# Patient Record
Sex: MALE | Race: WHITE | NOT HISPANIC OR LATINO | Employment: UNEMPLOYED | URBAN - METROPOLITAN AREA
[De-identification: names, ages, dates, MRNs, and addresses within clinical notes are randomized per-mention and may not be internally consistent; named-entity substitution may affect disease eponyms.]

---

## 2023-07-31 LAB
ALANINE AMINOTRANSFERASE (SGPT) (U/L) IN SER/PLAS: 23 U/L (ref 10–52)
ALBUMIN (G/DL) IN SER/PLAS: 4.6 G/DL (ref 3.4–5)
ALKALINE PHOSPHATASE (U/L) IN SER/PLAS: 85 U/L (ref 33–120)
ANION GAP IN SER/PLAS: 13 MMOL/L (ref 10–20)
ASPARTATE AMINOTRANSFERASE (SGOT) (U/L) IN SER/PLAS: 16 U/L (ref 9–39)
BASOPHILS (10*3/UL) IN BLOOD BY AUTOMATED COUNT: 0.07 X10E9/L (ref 0–0.1)
BASOPHILS/100 LEUKOCYTES IN BLOOD BY AUTOMATED COUNT: 0.9 % (ref 0–2)
BILIRUBIN TOTAL (MG/DL) IN SER/PLAS: 0.4 MG/DL (ref 0–1.2)
CALCIUM (MG/DL) IN SER/PLAS: 9.5 MG/DL (ref 8.6–10.6)
CARBON DIOXIDE, TOTAL (MMOL/L) IN SER/PLAS: 27 MMOL/L (ref 21–32)
CARCINOEMBRYONIC AG (NG/ML) IN SER/PLAS: <0.5 UG/L
CHLORIDE (MMOL/L) IN SER/PLAS: 102 MMOL/L (ref 98–107)
CREATININE (MG/DL) IN SER/PLAS: 0.99 MG/DL (ref 0.5–1.3)
EOSINOPHILS (10*3/UL) IN BLOOD BY AUTOMATED COUNT: 0.19 X10E9/L (ref 0–0.7)
EOSINOPHILS/100 LEUKOCYTES IN BLOOD BY AUTOMATED COUNT: 2.4 % (ref 0–6)
ERYTHROCYTE DISTRIBUTION WIDTH (RATIO) BY AUTOMATED COUNT: 14.4 % (ref 11.5–14.5)
ERYTHROCYTE MEAN CORPUSCULAR HEMOGLOBIN CONCENTRATION (G/DL) BY AUTOMATED: 32.1 G/DL (ref 32–36)
ERYTHROCYTE MEAN CORPUSCULAR VOLUME (FL) BY AUTOMATED COUNT: 90 FL (ref 80–100)
ERYTHROCYTES (10*6/UL) IN BLOOD BY AUTOMATED COUNT: 5.84 X10E12/L (ref 4.5–5.9)
GFR MALE: 90 ML/MIN/1.73M2
GLUCOSE (MG/DL) IN SER/PLAS: 215 MG/DL (ref 74–99)
HEMATOCRIT (%) IN BLOOD BY AUTOMATED COUNT: 52.4 % (ref 41–52)
HEMOGLOBIN (G/DL) IN BLOOD: 16.8 G/DL (ref 13.5–17.5)
IMMATURE GRANULOCYTES/100 LEUKOCYTES IN BLOOD BY AUTOMATED COUNT: 0.3 % (ref 0–0.9)
LEUKOCYTES (10*3/UL) IN BLOOD BY AUTOMATED COUNT: 8 X10E9/L (ref 4.4–11.3)
LYMPHOCYTES (10*3/UL) IN BLOOD BY AUTOMATED COUNT: 2.11 X10E9/L (ref 1.2–4.8)
LYMPHOCYTES/100 LEUKOCYTES IN BLOOD BY AUTOMATED COUNT: 26.4 % (ref 13–44)
MONOCYTES (10*3/UL) IN BLOOD BY AUTOMATED COUNT: 0.5 X10E9/L (ref 0.1–1)
MONOCYTES/100 LEUKOCYTES IN BLOOD BY AUTOMATED COUNT: 6.3 % (ref 2–10)
NEUTROPHILS (10*3/UL) IN BLOOD BY AUTOMATED COUNT: 5.1 X10E9/L (ref 1.2–7.7)
NEUTROPHILS/100 LEUKOCYTES IN BLOOD BY AUTOMATED COUNT: 63.7 % (ref 40–80)
NRBC (PER 100 WBCS) BY AUTOMATED COUNT: 0 /100 WBC (ref 0–0)
PLATELETS (10*3/UL) IN BLOOD AUTOMATED COUNT: 283 X10E9/L (ref 150–450)
POTASSIUM (MMOL/L) IN SER/PLAS: 4 MMOL/L (ref 3.5–5.3)
PROTEIN TOTAL: 7.5 G/DL (ref 6.4–8.2)
SODIUM (MMOL/L) IN SER/PLAS: 138 MMOL/L (ref 136–145)
UREA NITROGEN (MG/DL) IN SER/PLAS: 17 MG/DL (ref 6–23)

## 2023-09-12 PROBLEM — C20 RECTAL ADENOCARCINOMA (MULTI): Status: ACTIVE | Noted: 2023-09-12

## 2023-09-12 PROBLEM — K61.0 PERIANAL ABSCESS: Status: ACTIVE | Noted: 2023-09-12

## 2023-09-12 PROBLEM — Z92.3 HISTORY OF RADIATION THERAPY: Status: ACTIVE | Noted: 2023-09-12

## 2023-09-12 PROBLEM — I10 HYPERTENSION: Status: ACTIVE | Noted: 2023-09-12

## 2023-09-12 PROBLEM — E78.00 HYPERCHOLESTEROLEMIA: Status: ACTIVE | Noted: 2023-09-12

## 2023-09-12 PROBLEM — A06.9 AMEBIASIS: Status: ACTIVE | Noted: 2023-09-12

## 2023-09-12 PROBLEM — K58.9 IRRITABLE BOWEL SYNDROME: Status: ACTIVE | Noted: 2023-09-12

## 2023-09-12 RX ORDER — LOPERAMIDE HCL 2 MG
2 TABLET ORAL AS NEEDED
COMMUNITY
Start: 2018-10-31

## 2023-09-12 RX ORDER — ATORVASTATIN CALCIUM 20 MG/1
1 TABLET, FILM COATED ORAL DAILY
COMMUNITY
Start: 2018-10-23

## 2023-09-12 RX ORDER — ALPRAZOLAM 0.25 MG/1
1 TABLET ORAL NIGHTLY PRN
COMMUNITY

## 2023-09-12 RX ORDER — OMEPRAZOLE 20 MG/1
1 CAPSULE, DELAYED RELEASE ORAL DAILY
COMMUNITY
Start: 2017-08-20

## 2023-09-12 RX ORDER — LISINOPRIL AND HYDROCHLOROTHIAZIDE 10; 12.5 MG/1; MG/1
1 TABLET ORAL DAILY
COMMUNITY
Start: 2017-08-20

## 2023-09-12 RX ORDER — METOPROLOL TARTRATE 100 MG/1
1 TABLET ORAL DAILY
COMMUNITY

## 2023-09-12 RX ORDER — IBUPROFEN 800 MG/1
1 TABLET ORAL 2 TIMES DAILY
COMMUNITY
Start: 2022-12-05 | End: 2024-02-06 | Stop reason: SDUPTHER

## 2023-09-12 RX ORDER — ACETAMINOPHEN 500 MG
2 TABLET ORAL 2 TIMES DAILY PRN
COMMUNITY
Start: 2019-04-12

## 2023-09-12 RX ORDER — ACETAMINOPHEN 325 MG/1
2 TABLET ORAL EVERY 6 HOURS PRN
COMMUNITY
Start: 2019-03-08

## 2023-09-12 RX ORDER — METOPROLOL SUCCINATE 100 MG/1
1 TABLET, EXTENDED RELEASE ORAL DAILY
COMMUNITY
Start: 2017-08-20

## 2023-09-12 RX ORDER — DIPHENOXYLATE HYDROCHLORIDE AND ATROPINE SULFATE 2.5; .025 MG/1; MG/1
2 TABLET ORAL 4 TIMES DAILY PRN
COMMUNITY
Start: 2022-12-05

## 2023-09-12 RX ORDER — PIOGLITAZONEHYDROCHLORIDE 45 MG/1
45 TABLET ORAL
COMMUNITY

## 2023-09-12 RX ORDER — GLIMEPIRIDE 4 MG/1
1 TABLET ORAL 2 TIMES DAILY
COMMUNITY
Start: 2018-10-23

## 2023-10-16 ENCOUNTER — APPOINTMENT (OUTPATIENT)
Dept: GASTROENTEROLOGY | Facility: HOSPITAL | Age: 53
End: 2023-10-16
Payer: COMMERCIAL

## 2024-02-06 ENCOUNTER — OFFICE VISIT (OUTPATIENT)
Dept: HEMATOLOGY/ONCOLOGY | Facility: HOSPITAL | Age: 54
End: 2024-02-06
Payer: COMMERCIAL

## 2024-02-06 ENCOUNTER — LAB (OUTPATIENT)
Dept: LAB | Facility: HOSPITAL | Age: 54
End: 2024-02-06
Payer: COMMERCIAL

## 2024-02-06 VITALS
WEIGHT: 198.8 LBS | SYSTOLIC BLOOD PRESSURE: 113 MMHG | OXYGEN SATURATION: 99 % | HEART RATE: 77 BPM | RESPIRATION RATE: 18 BRPM | TEMPERATURE: 97 F | DIASTOLIC BLOOD PRESSURE: 70 MMHG | BODY MASS INDEX: 29.44 KG/M2 | HEIGHT: 69 IN

## 2024-02-06 DIAGNOSIS — C20 RECTAL ADENOCARCINOMA (MULTI): Primary | ICD-10-CM

## 2024-02-06 DIAGNOSIS — C20 RECTAL ADENOCARCINOMA (MULTI): ICD-10-CM

## 2024-02-06 DIAGNOSIS — Z08 ENCOUNTER FOR FOLLOW-UP SURVEILLANCE OF RECTAL CANCER: ICD-10-CM

## 2024-02-06 DIAGNOSIS — Z85.048 ENCOUNTER FOR FOLLOW-UP SURVEILLANCE OF RECTAL CANCER: ICD-10-CM

## 2024-02-06 LAB
ALBUMIN SERPL BCP-MCNC: 4.4 G/DL (ref 3.4–5)
ALP SERPL-CCNC: 80 U/L (ref 33–120)
ALT SERPL W P-5'-P-CCNC: 30 U/L (ref 10–52)
ANION GAP SERPL CALC-SCNC: 14 MMOL/L (ref 10–20)
AST SERPL W P-5'-P-CCNC: 16 U/L (ref 9–39)
BASOPHILS # BLD AUTO: 0.05 X10*3/UL (ref 0–0.1)
BASOPHILS NFR BLD AUTO: 0.6 %
BILIRUB SERPL-MCNC: 0.6 MG/DL (ref 0–1.2)
BUN SERPL-MCNC: 17 MG/DL (ref 6–23)
CALCIUM SERPL-MCNC: 9.4 MG/DL (ref 8.6–10.3)
CEA SERPL-MCNC: 1.1 UG/L
CHLORIDE SERPL-SCNC: 100 MMOL/L (ref 98–107)
CO2 SERPL-SCNC: 30 MMOL/L (ref 21–32)
CREAT SERPL-MCNC: 0.85 MG/DL (ref 0.5–1.3)
EGFRCR SERPLBLD CKD-EPI 2021: >90 ML/MIN/1.73M*2
EOSINOPHIL # BLD AUTO: 0.21 X10*3/UL (ref 0–0.7)
EOSINOPHIL NFR BLD AUTO: 2.5 %
ERYTHROCYTE [DISTWIDTH] IN BLOOD BY AUTOMATED COUNT: 14.3 % (ref 11.5–14.5)
GLUCOSE SERPL-MCNC: 161 MG/DL (ref 74–99)
HCT VFR BLD AUTO: 49.2 % (ref 41–52)
HGB BLD-MCNC: 16.2 G/DL (ref 13.5–17.5)
IMM GRANULOCYTES # BLD AUTO: 0.02 X10*3/UL (ref 0–0.7)
IMM GRANULOCYTES NFR BLD AUTO: 0.2 % (ref 0–0.9)
LYMPHOCYTES # BLD AUTO: 1.89 X10*3/UL (ref 1.2–4.8)
LYMPHOCYTES NFR BLD AUTO: 22.1 %
MCH RBC QN AUTO: 29 PG (ref 26–34)
MCHC RBC AUTO-ENTMCNC: 32.9 G/DL (ref 32–36)
MCV RBC AUTO: 88 FL (ref 80–100)
MONOCYTES # BLD AUTO: 0.58 X10*3/UL (ref 0.1–1)
MONOCYTES NFR BLD AUTO: 6.8 %
NEUTROPHILS # BLD AUTO: 5.8 X10*3/UL (ref 1.2–7.7)
NEUTROPHILS NFR BLD AUTO: 67.8 %
NRBC BLD-RTO: 0 /100 WBCS (ref 0–0)
PLATELET # BLD AUTO: 268 X10*3/UL (ref 150–450)
POTASSIUM SERPL-SCNC: 3.7 MMOL/L (ref 3.5–5.3)
PROT SERPL-MCNC: 7 G/DL (ref 6.4–8.2)
RBC # BLD AUTO: 5.58 X10*6/UL (ref 4.5–5.9)
SODIUM SERPL-SCNC: 140 MMOL/L (ref 136–145)
WBC # BLD AUTO: 8.6 X10*3/UL (ref 4.4–11.3)

## 2024-02-06 PROCEDURE — 3078F DIAST BP <80 MM HG: CPT | Performed by: STUDENT IN AN ORGANIZED HEALTH CARE EDUCATION/TRAINING PROGRAM

## 2024-02-06 PROCEDURE — 84075 ASSAY ALKALINE PHOSPHATASE: CPT

## 2024-02-06 PROCEDURE — 82378 CARCINOEMBRYONIC ANTIGEN: CPT

## 2024-02-06 PROCEDURE — 99214 OFFICE O/P EST MOD 30 MIN: CPT | Performed by: STUDENT IN AN ORGANIZED HEALTH CARE EDUCATION/TRAINING PROGRAM

## 2024-02-06 PROCEDURE — 85025 COMPLETE CBC W/AUTO DIFF WBC: CPT

## 2024-02-06 PROCEDURE — 36415 COLL VENOUS BLD VENIPUNCTURE: CPT

## 2024-02-06 PROCEDURE — 3074F SYST BP LT 130 MM HG: CPT | Performed by: STUDENT IN AN ORGANIZED HEALTH CARE EDUCATION/TRAINING PROGRAM

## 2024-02-06 RX ORDER — IBUPROFEN 800 MG/1
800 TABLET ORAL EVERY 8 HOURS PRN
Qty: 90 TABLET | Refills: 3 | Status: SHIPPED | OUTPATIENT
Start: 2024-02-06

## 2024-02-06 ASSESSMENT — PAIN SCALES - GENERAL: PAINLEVEL: 5

## 2024-02-15 PROBLEM — Z08 ENCOUNTER FOR FOLLOW-UP SURVEILLANCE OF RECTAL CANCER: Status: ACTIVE | Noted: 2024-02-15

## 2024-02-15 PROBLEM — Z85.048 ENCOUNTER FOR FOLLOW-UP SURVEILLANCE OF RECTAL CANCER: Status: ACTIVE | Noted: 2024-02-15

## 2024-02-15 NOTE — PROGRESS NOTES
"Patient ID: Francisco Lowry is a 53 y.o. male.      Digestive System-Colon and Rectum         AJCC Edition: 7th (AJCC), Diagnosis Date: 19-Sep-2017, III, T3 N2 M0      Treatment Synopsis:  NRG-GI-002 starting 10/25/17  neoadjuvant chemo with folfox 10/25/17-18     xeloda/xrt: 3/14/18-4/24/18     10/18:  Robotic-assisted proctectomy with coloanal anastomosis, flexible sigmoidoscopy, transversus abdominis plane  block, and splenic flexure mobilization.   Reversal ileostomy and colonoscopy on 2019     Current therapy:surveillence      Treatment History:  pmh  htn  hyperlipidemia  gerd     PSH  none     family hx  father lung cancer   at 45 (dx at 41)  maternal  grandmother colon cancer at 60      Subjective:  Overall, doing well. Wasn't able to get Colonoscopy in September and now he is dealing with his mom who is having health issues. He will get it done when he is done with dealing with his mom's health concerns.  Denies bowel movement issues, no blood or changes in bowel changes.    No fevers, chills, chest pain, shortness of breath, nausea, vomiting, rashes or masses.     ROS as above. Remainder of 10 point review of systems elicited and otherwise unremarkable.        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   He shows no clinical or radiographic signs to suggest disease recurrence.   CEA remains undetectable.     Based on NCCN guidelines, he no longer needs to follow up but he would like to continue to be followed yearly.         Plan:      Follow up yearly   Will need a Colonoscopy ASAP. Will call when he is ready to get it scheduled   Rx sent in for Ibuprofen

## 2025-02-04 ENCOUNTER — LAB (OUTPATIENT)
Dept: LAB | Facility: HOSPITAL | Age: 55
End: 2025-02-04
Payer: COMMERCIAL

## 2025-02-04 DIAGNOSIS — C20 RECTAL ADENOCARCINOMA (MULTI): ICD-10-CM

## 2025-02-04 LAB
ALBUMIN SERPL BCP-MCNC: 4.2 G/DL (ref 3.4–5)
ALP SERPL-CCNC: 103 U/L (ref 33–120)
ALT SERPL W P-5'-P-CCNC: 39 U/L (ref 10–52)
ANION GAP SERPL CALC-SCNC: 13 MMOL/L (ref 10–20)
AST SERPL W P-5'-P-CCNC: 24 U/L (ref 9–39)
BASOPHILS # BLD AUTO: 0.05 X10*3/UL (ref 0–0.1)
BASOPHILS NFR BLD AUTO: 0.6 %
BILIRUB SERPL-MCNC: 0.6 MG/DL (ref 0–1.2)
BUN SERPL-MCNC: 17 MG/DL (ref 6–23)
CALCIUM SERPL-MCNC: 9.1 MG/DL (ref 8.6–10.3)
CHLORIDE SERPL-SCNC: 100 MMOL/L (ref 98–107)
CO2 SERPL-SCNC: 28 MMOL/L (ref 21–32)
CREAT SERPL-MCNC: 0.81 MG/DL (ref 0.5–1.3)
EGFRCR SERPLBLD CKD-EPI 2021: >90 ML/MIN/1.73M*2
EOSINOPHIL # BLD AUTO: 0.17 X10*3/UL (ref 0–0.7)
EOSINOPHIL NFR BLD AUTO: 2.2 %
ERYTHROCYTE [DISTWIDTH] IN BLOOD BY AUTOMATED COUNT: 13.4 % (ref 11.5–14.5)
GLUCOSE SERPL-MCNC: 212 MG/DL (ref 74–99)
HCT VFR BLD AUTO: 43.3 % (ref 41–52)
HGB BLD-MCNC: 15 G/DL (ref 13.5–17.5)
IMM GRANULOCYTES # BLD AUTO: 0.02 X10*3/UL (ref 0–0.7)
IMM GRANULOCYTES NFR BLD AUTO: 0.3 % (ref 0–0.9)
LYMPHOCYTES # BLD AUTO: 2.1 X10*3/UL (ref 1.2–4.8)
LYMPHOCYTES NFR BLD AUTO: 27 %
MCH RBC QN AUTO: 30.7 PG (ref 26–34)
MCHC RBC AUTO-ENTMCNC: 34.6 G/DL (ref 32–36)
MCV RBC AUTO: 89 FL (ref 80–100)
MONOCYTES # BLD AUTO: 0.43 X10*3/UL (ref 0.1–1)
MONOCYTES NFR BLD AUTO: 5.5 %
NEUTROPHILS # BLD AUTO: 5.01 X10*3/UL (ref 1.2–7.7)
NEUTROPHILS NFR BLD AUTO: 64.4 %
NRBC BLD-RTO: 0 /100 WBCS (ref 0–0)
PLATELET # BLD AUTO: 242 X10*3/UL (ref 150–450)
POTASSIUM SERPL-SCNC: 3.8 MMOL/L (ref 3.5–5.3)
PROT SERPL-MCNC: 6.8 G/DL (ref 6.4–8.2)
RBC # BLD AUTO: 4.89 X10*6/UL (ref 4.5–5.9)
SODIUM SERPL-SCNC: 137 MMOL/L (ref 136–145)
WBC # BLD AUTO: 7.8 X10*3/UL (ref 4.4–11.3)

## 2025-02-04 PROCEDURE — 85025 COMPLETE CBC W/AUTO DIFF WBC: CPT

## 2025-02-04 PROCEDURE — 36415 COLL VENOUS BLD VENIPUNCTURE: CPT

## 2025-02-04 PROCEDURE — 84075 ASSAY ALKALINE PHOSPHATASE: CPT

## 2025-02-04 PROCEDURE — 82378 CARCINOEMBRYONIC ANTIGEN: CPT

## 2025-02-05 ENCOUNTER — NUTRITION (OUTPATIENT)
Dept: HEMATOLOGY/ONCOLOGY | Facility: HOSPITAL | Age: 55
End: 2025-02-05
Payer: COMMERCIAL

## 2025-02-05 ENCOUNTER — OFFICE VISIT (OUTPATIENT)
Dept: HEMATOLOGY/ONCOLOGY | Facility: HOSPITAL | Age: 55
End: 2025-02-05
Payer: COMMERCIAL

## 2025-02-05 VITALS
TEMPERATURE: 97.5 F | DIASTOLIC BLOOD PRESSURE: 81 MMHG | HEART RATE: 82 BPM | RESPIRATION RATE: 18 BRPM | OXYGEN SATURATION: 99 % | WEIGHT: 195.55 LBS | HEIGHT: 69 IN | BODY MASS INDEX: 28.96 KG/M2 | SYSTOLIC BLOOD PRESSURE: 115 MMHG

## 2025-02-05 DIAGNOSIS — Z85.048 ENCOUNTER FOR FOLLOW-UP SURVEILLANCE OF RECTAL CANCER: ICD-10-CM

## 2025-02-05 DIAGNOSIS — Z08 ENCOUNTER FOR FOLLOW-UP SURVEILLANCE OF RECTAL CANCER: ICD-10-CM

## 2025-02-05 DIAGNOSIS — C20 RECTAL ADENOCARCINOMA (MULTI): Primary | ICD-10-CM

## 2025-02-05 LAB — CEA SERPL-MCNC: 1.2 UG/L

## 2025-02-05 PROCEDURE — 3079F DIAST BP 80-89 MM HG: CPT | Performed by: STUDENT IN AN ORGANIZED HEALTH CARE EDUCATION/TRAINING PROGRAM

## 2025-02-05 PROCEDURE — 99214 OFFICE O/P EST MOD 30 MIN: CPT | Performed by: STUDENT IN AN ORGANIZED HEALTH CARE EDUCATION/TRAINING PROGRAM

## 2025-02-05 PROCEDURE — 3074F SYST BP LT 130 MM HG: CPT | Performed by: STUDENT IN AN ORGANIZED HEALTH CARE EDUCATION/TRAINING PROGRAM

## 2025-02-05 PROCEDURE — 3008F BODY MASS INDEX DOCD: CPT | Performed by: STUDENT IN AN ORGANIZED HEALTH CARE EDUCATION/TRAINING PROGRAM

## 2025-02-05 RX ORDER — DULAGLUTIDE 0.75 MG/.5ML
0.75 INJECTION, SOLUTION SUBCUTANEOUS
COMMUNITY

## 2025-02-05 ASSESSMENT — PAIN SCALES - GENERAL: PAINLEVEL_OUTOF10: 0-NO PAIN

## 2025-02-05 NOTE — PROGRESS NOTES
"NUTRITION Assessment NOTE    Nutrition Assessment     Reason for Visit:  Francisco Lowry is a 54 y.o. male who presents for follow up  Pt is well known to me when he was undergoing his treatment for his colorectal cancer  back in 2017    He is still struggling with BS management- he goes to the [facility] for this and has met with a RDN there  He is changing meds around to treat his DM and is currently taking Trulicity  He feels this has helped and has curbed his intake  He continues to eat well - labs noted below     Patient Active Problem List   Diagnosis    Amebiasis    Hypercholesterolemia    Hypertension    Irritable bowel syndrome    Perianal abscess    Rectal adenocarcinoma (Multi)    History of radiation therapy    Encounter for follow-up surveillance of rectal cancer       Nutrition Significant Labs:  Lab Results   Component Value Date/Time    GLUCOSE 212 (H) 02/04/2025 1119     02/04/2025 1119    K 3.8 02/04/2025 1119     02/04/2025 1119    CO2 28 02/04/2025 1119    ANIONGAP 13 02/04/2025 1119    BUN 17 02/04/2025 1119    CREATININE 0.81 02/04/2025 1119    EGFR >90 02/04/2025 1119    CALCIUM 9.1 02/04/2025 1119    ALBUMIN 4.2 02/04/2025 1119    ALKPHOS 103 02/04/2025 1119    PROT 6.8 02/04/2025 1119    AST 24 02/04/2025 1119    BILITOT 0.6 02/04/2025 1119    ALT 39 02/04/2025 1119    MG 2.00 03/07/2019 0600    PHOS 3.2 10/25/2018 0612     No results found for: \"VITD25\"      Anthropometrics:                          HT: 174.1 cm   IBW: 72.7 kg  122% IBW    BMI: 29.3    Weight History:   Daily Weight  02/05/25 : 88.7 kg (195 lb 8.8 oz)  02/06/24 : 90.2 kg (198 lb 12.8 oz)  12/05/22 : 94 kg (207 lb 3.7 oz)  06/06/22 : 94.2 kg (207 lb 10.8 oz)  11/10/21 : 96.4 kg (212 lb 8.4 oz)  05/12/21 : 97.9 kg (215 lb 13.3 oz)  09/25/20 : 96.2 kg (212 lb)  08/18/20 : 86 kg (189 lb 9.5 oz)  05/06/20 : 96.3 kg (212 lb 4.9 oz)  02/14/19 : 91.1 kg (200 lb 13.4 oz)    Weight Change %:       Nutrition History:  Food & " Nutrition History:    Food Allergies:    Food Intolerances:    Vitamin/mineral intake:       Herbal supplements:    Medication and Complementary/Alternative Medicine Use:    Dentition:    Sleep Habits:      Diet Recall:  Meal 1:    Snack 1:    Meal 2:    Snack 2:    Meal 3:    Snack 3:    Food Variety:    Oral Nutrition Supplement Use:          Fluid Intake:    Energy Intake:      Food Preparation:  Cooking:    Grocery Shopping:    Dining Out:      Medications:  Current Outpatient Medications   Medication Instructions    ALPRAZolam (Xanax) 0.25 mg tablet 1 tablet, Nightly PRN    atorvastatin (Lipitor) 20 mg tablet 1 tablet, Daily    diphenoxylate-atropine (Lomotil) 2.5-0.025 mg tablet 2 tablets, 4 times daily PRN    glimepiride (Amaryl) 4 mg tablet 1 tablet, 2 times daily    ibuprofen 800 mg, oral, Every 8 hours PRN    lisinopriL-hydrochlorothiazide 10-12.5 mg tablet 1 tablet, Daily    loperamide (Imodium A-D) 2 mg tablet 2 tablets, As needed    metoprolol succinate XL (Toprol-XL) 100 mg 24 hr tablet 1 tablet, Daily    MULTIVITAMIN ORAL 1 capsule, Daily    omeprazole (PriLOSEC) 20 mg DR capsule 1 capsule, Daily    Trulicity 0.75 mg, Once Weekly       Nutrition Focused Physical Exam Findings:    Subcutaneous Fat Loss:   Orbital Fat Pads: Well nourished (slightly bulging fat pads)  Buccal Fat Pads: Well nourished (full, rounded cheeks)  Triceps: Well nourished (ample fat tissue)    Muscle Wasting:  Temporalis: Well nourished (well-defined muscle)  Pectoralis (Clavicular Region): Well nourished (clavicle not visible)  Deltoid/Trapezius: Well nourished (rounded appearance at arm, shoulder, neck)  Interosseous: Well nourished (muscle bulges)    Physical Findings:     Edema: none    Estimated Needs:     Dosing weight: 88.7 kg  Calories per day: 2220  determined by 25 kcal/kg  Protein (g) per day: 105-110 determined by 1.2 g/kg  Estimated fluid needs: 2220 determined by 1 kcal/mL                       Nutrition Diagnosis    Malnutrition Diagnosis  Patient has Malnutrition Diagnosis: No  Nutrition Diagnosis  Patient has Nutrition Diagnosis: Yes  Diagnosis Status (1): New  Nutrition Diagnosis 1: Altered nutrition related to laboratory values  Related to (1): DM  As Evidenced by (1): ABUNDIO       Nutrition Interventions/Recommendations   Nutrition Prescription: Oral nutrition continue to work with CHO intake and weight management    Nutrition Interventions:   Food and Nutrient Delivery:       Coordination of Care: Goals: med onc - pt in survivorship     Nutrition Education:   Nutrition Education Content: Content related nutrition education  reviewed goals                 Nutrition Monitoring and Evaluation        Anthropometric measurements  Monitoring and Evaluation Plan: Weight  Body Weight: Body weight    Biochemical Data, Medical Tests and Procedures  Monitoring and Evaluation Plan: Glucose/endocrine profile  Glucose/Endocrine Profile: Glucose, casual, Hemoglobin A1c (HgbA1c)              Follow Up: nothing scheduled- pt comes in yearly

## 2025-02-05 NOTE — PROGRESS NOTES
Medications and allergies reviewed with the patient. Skin, falls, and nutrition assessments completed.    Patient is here for his yearly follow up with Jeanette. He states he has been doing well. Patient mentions he is living with his mom and taking care of her because she had a stroke last year. Patient states he feels tired some days from how busy he is but tries to take care of himself and knows when he needs extra rest.    Patient denies weakness, lightheadedness, and dizziness.    Patient states he is eating well and has lost a few pounds because he walks a lot. Denies nausea, vomiting, and constipation. Patient states he occasionally has diarrhea depending on what he eats. He mentions he eats 2-3 meals per day.

## 2025-02-21 NOTE — PROGRESS NOTES
"Patient ID: Francisco Lowry is a 54 y.o. male.      Digestive System-Colon and Rectum         AJCC Edition: 7th (AJCC), Diagnosis Date: 19-Sep-2017, III, T3 N2 M0      Treatment Synopsis:  NRG-GI-002 starting 10/25/17  neoadjuvant chemo with folfox 10/25/17-18     xeloda/xrt: 3/14/     10/2018:  Robotic-assisted proctectomy with coloanal anastomosis, flexible sigmoidoscopy, transversus abdominis plane  block, and splenic flexure mobilization.   Reversal ileostomy and colonoscopy on 2019     Current therapy:surveillence      Treatment History:  pmh  htn  hyperlipidemia  gerd     PSH  none     family hx  father lung cancer   at 45 (dx at 41)  maternal  grandmother colon cancer at 60      Subjective:  Overall, doing well. Wasn't able to get Colonoscopy in September because his mom had a stroke and he has been taking care of her. Since starting Trulicity, he has lost weight. He is also trying to walk more. Sometimes he has diarrhea depending on what he eats, will take Imodium.     Denies bowel movement issues, no blood or changes in bowel changes.    No fevers, chills, chest pain, shortness of breath, nausea, vomiting, rashes or masses.     ROS as above. Remainder of 10 point review of systems elicited and otherwise unremarkable.        Objective:  BSA: 2.07 meters squared  /81 (BP Location: Right arm, Patient Position: Sitting, BP Cuff Size: Adult)   Pulse 82   Temp 36.4 °C (97.5 °F) (Temporal)   Resp 18   Ht (S) 1.741 m (5' 8.54\")   Wt 88.7 kg (195 lb 8.8 oz)   SpO2 99%   BMI 29.26 kg/m²      Daily Weight  25 : 88.7 kg (195 lb 8.8 oz)  24 : 90.2 kg (198 lb 12.8 oz)  22 : 94 kg (207 lb 3.7 oz)  22 : 94.2 kg (207 lb 10.8 oz)  11/10/21 : 96.4 kg (212 lb 8.4 oz)  21 : 97.9 kg (215 lb 13.3 oz)  20 : 96.2 kg (212 lb)     Social Substance History:  Smoking Status current every day smoker   Additional History  35 pk yr hx  smokes a pack/day  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   He shows no clinical signs to suggest disease recurrence.   CEA remains undetectable.     Based on NCCN guidelines, he no longer needs to follow up but he would like to continue to be followed yearly.         Plan:      Follow up yearly + labs same day  Will need a Colonoscopy ASAP. Will call when he is ready to get it scheduled

## 2025-05-20 ENCOUNTER — OFFICE VISIT (OUTPATIENT)
Dept: URGENT CARE | Age: 55
End: 2025-05-20
Payer: COMMERCIAL

## 2025-05-20 VITALS
HEIGHT: 70 IN | BODY MASS INDEX: 26.63 KG/M2 | SYSTOLIC BLOOD PRESSURE: 133 MMHG | RESPIRATION RATE: 18 BRPM | OXYGEN SATURATION: 97 % | WEIGHT: 186 LBS | TEMPERATURE: 98 F | DIASTOLIC BLOOD PRESSURE: 89 MMHG | HEART RATE: 87 BPM

## 2025-05-20 DIAGNOSIS — J01.41 ACUTE RECURRENT PANSINUSITIS: Primary | ICD-10-CM

## 2025-05-20 RX ORDER — METHYLPREDNISOLONE 4 MG/1
TABLET ORAL
Qty: 21 TABLET | Refills: 0 | Status: SHIPPED | OUTPATIENT
Start: 2025-05-20

## 2025-05-20 RX ORDER — DEXTROMETHORPHAN POLISTIREX 30 MG/5ML
60 SUSPENSION ORAL 2 TIMES DAILY
Qty: 89 ML | Refills: 0 | Status: SHIPPED | OUTPATIENT
Start: 2025-05-20 | End: 2025-05-30

## 2025-05-20 RX ORDER — AZITHROMYCIN 250 MG/1
TABLET, FILM COATED ORAL
Qty: 6 TABLET | Refills: 0 | Status: SHIPPED | OUTPATIENT
Start: 2025-05-20

## 2025-05-20 ASSESSMENT — PAIN SCALES - GENERAL: PAINLEVEL_OUTOF10: 7

## 2025-05-20 ASSESSMENT — ENCOUNTER SYMPTOMS
COUGH: 1
SHORTNESS OF BREATH: 0
LIGHT-HEADEDNESS: 0
WHEEZING: 0
SINUS PRESSURE: 1
NAUSEA: 0
SINUS PAIN: 1
CARDIOVASCULAR NEGATIVE: 1
HEADACHES: 0
VOMITING: 0
CHILLS: 0
FEVER: 0
SORE THROAT: 1
RHINORRHEA: 1
TROUBLE SWALLOWING: 0
DIZZINESS: 0

## 2025-05-20 NOTE — PATIENT INSTRUCTIONS
You have a sinus infection. You are being treated with antibiotics and steroids.  Please take as directed. Below are some tips/tricks for managing your symptoms.     Tricks to manage your symptoms:   -Ibuprofen (Advil® or Motrin®) or acetaminophen (Tylenol®) may be used for temperature and/or discomfort.  Do not exceed the recommended daily amount as written on the bottle.   -You can take a daily non-drowsy allergy medication such as Allergra (Fexofendadine), Zyrtec (Cetirizine) or Claritin (Loratadine) to decrease sinus and nasal inflammation and drainage.  -If you have elevated blood pressure you should avoid products with products that contain phenylephrine or Pseudophedrine as these medications can increase your blood pressure.  -Putting a small amount of honey in tea may help with your sore throat and cough.  -warm salt water gargles   -Nasal steroids spray (fluticasone or generic equivalent) 1-2 times daily as needed  -You can take OTC mucinex (Guaifenesin): Can take up to 1200 mg twice daily.    -Drink plenty of water. Water helps thin the mucus so your sinuses can drain more easily.   -Use a humidifier.  -inhale steam 3 to 4 times a day (for example, sit in the bathroom with the shower running).  -Apply a warm, moist washcloth to your face 3 to 4 times a day, or as directed by your caregiver.  -Nasal saline rinse or neti-pot    Instructions:   Please seek immediate medical evaluation if you develop:   -Shortness of breath or difficulty breathing  -You are unable to take a deep breath  -You have difficulties swallowing  -You have chest pain   -You have heart palpitations  -You have fever > 102 F despite fever reducing medications   -You are unable to tolerate fluids for 6 hours or more  -You develop swelling and/or pain in your legs   -You feel faint, lightheaded, or dizzy  -Any other concerning symptoms    Please follow up with PCP next week.

## 2025-05-20 NOTE — PROGRESS NOTES
"Subjective   Patient ID: Francisco Lowry is a 54 y.o. male. They present today with a chief complaint of Illness (Went to urgent care two weeks and still having sinus issues).    History of Present Illness  -c/o concern for sinus infection   -was seen at different urgent care 2 weeks ago and given 7 days of levofloxacin   -symptom improved but never fully resolved  -still having a lot of sinus pressure/pain, increased drainage  -history of sinus infections in the past  -denies CP, SOB, wheezing, fever, chills,       History provided by:  Patient and medical records      Past Medical History  Allergies as of 05/20/2025 - Reviewed 05/20/2025   Allergen Reaction Noted    Penicillins Other and Swelling 09/12/2023    Tetracyclines Unknown 09/12/2023    Cefuroxime axetil Rash 03/04/2014       Prescriptions Prior to Admission[1]     Medical History[2]    Surgical History[3]     reports that he has been smoking cigarettes. He has been exposed to tobacco smoke. He has never used smokeless tobacco.    Review of Systems  Review of Systems   Constitutional:  Negative for chills and fever.   HENT:  Positive for congestion, postnasal drip, rhinorrhea, sinus pressure, sinus pain and sore throat. Negative for ear pain and trouble swallowing.    Respiratory:  Positive for cough. Negative for shortness of breath and wheezing.    Cardiovascular: Negative.    Gastrointestinal:  Negative for nausea and vomiting.   Neurological:  Negative for dizziness, light-headedness and headaches.   All other systems reviewed and are negative.    Objective    Vitals:    05/20/25 1609   BP: 133/89   Pulse: 87   Resp: 18   Temp: 36.7 °C (98 °F)   TempSrc: Oral   SpO2: 97%   Weight: 84.4 kg (186 lb)   Height: 1.778 m (5' 10\")     No LMP for male patient.    Physical Exam  /89   Pulse 87   Temp 36.7 °C (98 °F) (Oral)   Resp 18   Ht 1.778 m (5' 10\")   Wt 84.4 kg (186 lb)   SpO2 97%   BMI 26.69 kg/m²   GEN: Alert, cooperative, NAD, Vitals " Reviewed.   SKIN:  No suspicious rashes.  ENT: Bilateral TMs without erythema, edema, retraction, or effusions.  B/l TM intact.   B/l ear canals unremarkable. No mastoid tenderness. + nasal congestion. + sinus tenderness to fingertap. Oropharyngeal erythema, b/l tonsils hyperemic but without exudate.  + post nasal drip.  Uvula midline. No uvula swelling.  No Cervical LAD.  No submental tenderness.  No evidence of PTA. No mastoid tenderness.   ROM intact.  No thyroid masses.  No trismus. Neck supple.  Trachea midline.  No meningeal signs.  RESP: Unlabored, no increased WOB, no accessory muscle use, no wheezing, rhonchi, or rales appreciated.   CARDS: RRR, no m/g/r      Procedures    Point of Care Test & Imaging Results from this visit  No results found for this visit on 05/20/25.   Imaging  No results found.    Cardiology, Vascular, and Other Imaging  No other imaging results found for the past 2 days      Diagnostic study results (if any) were reviewed by Tiffany Youngblood PA-C.    Assessment/Plan   Allergies, medications, history, and pertinent labs/EKGs/Imaging reviewed by Tiffany Youngblood PA-C.     Medical Decision Making  Rapid covid negative.  Rapid flu A/B negative.  Rapid strep negative.  History and physical is consistent with acute sinusitis.  No signs of OM, OE, Strep. Given duration or symptoms patient will be started on antibiotics.    Advised continue symptom management with push fluids, rest, gargle warm salt water, use vaporizer or mist prn, apply heat to sinuses prn, and return office visit prn if symptoms persist or worsen anti-histamines, anti-tussives, anti-mucolytics, and/or flonase etc.. Follow up with PCP for persistent symptoms.  Risks, benefits, and alternatives of the medications and treatment plan prescribed today were discussed, and patient expressed understanding. Plan follow up as discussed or as needed if any worsening symptoms or change in condition. Reinforced red flags including (but  not limited to): severe or worsening pain; difficulty swallowing; stiff neck; shortness of breath; coughing or vomiting blood; chest pain; and new or increased fever are indications to go to the Emergency Department.    At time of discharge patient was clinically well-appearing and HDS for outpatient management. The patient and/or family was educated regarding diagnosis, supportive care, OTC and Rx medications. The patient and/or family was given the opportunity to ask questions prior to discharge.  They verbalized understanding of my discussion of the plans for treatment, expected course, indications to return to  or seek further evaluation in ED, and the need for timely follow up as directed.   They were provided with a work/school excuse if requested.  AVS provided to patient.  All questions were answered and the patient verbalized understanding of the plan of care for today.       Orders and Diagnoses  Diagnoses and all orders for this visit:  Acute recurrent pansinusitis  -     methylPREDNISolone (Medrol Dospak) 4 mg tablets; Take as directed on package.  -     azithromycin (Zithromax) 250 mg tablet; Take 2 tablets (500 mg) by mouth day 1, and take 1 tablet (250 mg) by mouth daily on day 2-5  -     dextromethorphan polistirex ER (Delsym) 30 mg/5 mL suspension; Take 10 mL (60 mg) by mouth 2 times a day for 10 days.      Medical Admin Record      Patient disposition: Home    Electronically signed by Tiffany Youngblood PA-C  8:43 PM           [1] (Not in a hospital admission)   [2] No past medical history on file.  [3]   Past Surgical History:  Procedure Laterality Date    HEMORRHOID SURGERY  09/22/2017    Hemorrhoidectomy